# Patient Record
(demographics unavailable — no encounter records)

---

## 2025-05-25 NOTE — ASSESSMENT
[FreeTextEntry1] : reviewed medication post discharge from admission for NSTEMI and stent placement - Dr. Wilson [facility]-  review of his notes from end of December   continue current medications with f/u in 2 months as well as cardiology f/u labs from hospital stay   no lab orders low level MDM with continuity of care

## 2025-05-25 NOTE — PHYSICAL EXAM
[No Acute Distress] : no acute distress [Normal Sclera/Conjunctiva] : normal sclera/conjunctiva [No JVD] : no jugular venous distention [No Respiratory Distress] : no respiratory distress  [No Accessory Muscle Use] : no accessory muscle use [Clear to Auscultation] : lungs were clear to auscultation bilaterally [Normal Rate] : normal [Rhythm Regular] : regular [Normal S1] : normal S1 [Normal S2] : normal S2 [II] : a grade 2 [No Edema] : there was no peripheral edema [No Focal Deficits] : no focal deficits [Normal Gait] : normal gait [Normal Affect] : the affect was normal [Alert and Oriented x3] : oriented to person, place, and time [de-identified] : not lightheaded or dizzy when getting up  [de-identified] : some decreased memory will assist in looking for medication dispensers to keep her taking on

## 2025-05-25 NOTE — HISTORY OF PRESENT ILLNESS
[Coronary Artery Disease] : Coronary Artery Disease [Hyperlipidemia] : Hyperlipidemia [Hypertension] : Hypertension [Family Member] : family member [Does not check BP] : The patient is not checking blood pressure [<140/90] : Target blood pressure is <140/90 [Target goal met] : BP target goal met [Managed with medications] : managed with  medication [High Intensity therapy] : Patient is currently on high intensity statin therapy [Systolic] : systolic [None] : Patient does not have any symptoms [With moderate physical activity] : Shortness of breath with moderate physical activity [Checks Weight Sporadically] : The patient checks ~his/her~ weight sporadically [Stable] : The condition is stable [Symptoms Limit Activities] : Symptoms limit ~his/her~ activities [Able to walk ___ feet w/o symptoms] : ~he/she~ is able to walk [unfilled]  feet without symptoms [Stable] : Overall status has been stable [FreeTextEntry6] : Daughter is with the patient today [de-identified] : trying to follow dash diet - meals coming from family members, living alone with use of life line around her neck at all times while alone at home, aids during the day [FreeTextEntry1] : Post hospital for this had severe 3 vessel disease requiring multiple stents- on triple AC, asa, plavix and eliquis for afib

## 2025-05-25 NOTE — ASSESSMENT
[FreeTextEntry1] : reviewed medication post discharge from admission for NSTEMI and stent placement - Dr. Wilson [facility]-  review of his notes from end of December   continue current medications with f/u in 2 months as well as cardiology f/u labs from hospital stay   no lab orders low level MDM with continuity of care

## 2025-05-25 NOTE — HEALTH RISK ASSESSMENT
[Intercurrent hospitalizations] : was admitted to the hospital  [No] : In the past 12 months have you used drugs other than those required for medical reasons? No [No falls in past year] : Patient reported no falls in the past year [de-identified] : cardiology- for inpatient tx of NSTEMI- f/u with cardiology reviewed from post discharged [de-identified] : none at this time

## 2025-05-25 NOTE — HISTORY OF PRESENT ILLNESS
[Coronary Artery Disease] : Coronary Artery Disease [Hyperlipidemia] : Hyperlipidemia [Hypertension] : Hypertension [Family Member] : family member [Does not check BP] : The patient is not checking blood pressure [<140/90] : Target blood pressure is <140/90 [Target goal met] : BP target goal met [Managed with medications] : managed with  medication [High Intensity therapy] : Patient is currently on high intensity statin therapy [Systolic] : systolic [None] : Patient does not have any symptoms [With moderate physical activity] : Shortness of breath with moderate physical activity [Checks Weight Sporadically] : The patient checks ~his/her~ weight sporadically [Stable] : The condition is stable [Symptoms Limit Activities] : Symptoms limit ~his/her~ activities [Able to walk ___ feet w/o symptoms] : ~he/she~ is able to walk [unfilled]  feet without symptoms [Stable] : Overall status has been stable [FreeTextEntry6] : Daughter is with the patient today [de-identified] : trying to follow dash diet - meals coming from family members, living alone with use of life line around her neck at all times while alone at home, aids during the day [FreeTextEntry1] : Post hospital for this had severe 3 vessel disease requiring multiple stents- on triple AC, asa, plavix and eliquis for afib FAMILY HISTORY:  Father  Still living? No  Family history of premature coronary artery disease, Age at diagnosis: Age Unknown

## 2025-05-25 NOTE — REVIEW OF SYSTEMS
[Dryness] : dryness  [Lower Ext Edema] : lower extremity edema [Joint Pain] : joint pain [Back Pain] : back pain [Negative] : Heme/Lymph [Discharge] : no discharge [Pain] : no pain [Redness] : no redness [Vision Problems] : no vision problems [Itching] : no itching [Abdominal Pain] : no abdominal pain [Constipation] : no constipation [Diarrhea] : diarrhea [Vomiting] : no vomiting [Melena] : no melena [FreeTextEntry5] : varicose veins about the same, denies chest pain, sob or palpitations [FreeTextEntry9] : back pain worst in the am when getting up and at night when tired, hands and fingers much improved with topical nsaid

## 2025-05-25 NOTE — HEALTH RISK ASSESSMENT
[Intercurrent hospitalizations] : was admitted to the hospital  [No] : In the past 12 months have you used drugs other than those required for medical reasons? No [No falls in past year] : Patient reported no falls in the past year [de-identified] : cardiology- for inpatient tx of NSTEMI- f/u with cardiology reviewed from post discharged [de-identified] : none at this time

## 2025-05-25 NOTE — PHYSICAL EXAM
[No Acute Distress] : no acute distress [Normal Sclera/Conjunctiva] : normal sclera/conjunctiva [No JVD] : no jugular venous distention [No Respiratory Distress] : no respiratory distress  [No Accessory Muscle Use] : no accessory muscle use [Clear to Auscultation] : lungs were clear to auscultation bilaterally [Normal Rate] : normal [Rhythm Regular] : regular [Normal S1] : normal S1 [Normal S2] : normal S2 [II] : a grade 2 [No Edema] : there was no peripheral edema [No Focal Deficits] : no focal deficits [Normal Gait] : normal gait [Normal Affect] : the affect was normal [Alert and Oriented x3] : oriented to person, place, and time [de-identified] : not lightheaded or dizzy when getting up  [de-identified] : some decreased memory will assist in looking for medication dispensers to keep her taking on

## 2025-05-25 NOTE — CURRENT MEDS
[Takes medication as prescribed] : takes [None] : Patient does not have any barriers to medication adherence [Yes] : Reviewed medication list for presence of high-risk medications. [Blood Thinners] : blood thinners [FreeTextEntry1] : asa, stain, b blocker, and plavix, also eliquis

## 2025-07-22 NOTE — REVIEW OF SYSTEMS
[Fever] : no fever [Chills] : no chills [Heart Rate Is Slow] : the heart rate was not slow [Heart Rate Is Fast] : the heart rate was not fast [Chest Pain] : no chest pain [Palpitations] : no palpitations [Lower Ext Edema] : no lower extremity edema [Shortness Of Breath] : no shortness of breath [Wheezing] : no wheezing [Cough] : no cough [Abdominal Pain] : no abdominal pain [Vomiting] : no vomiting [Constipation] : no constipation [Diarrhea] : no diarrhea [Dysuria] : no dysuria [Confused] : no confusion [Dizziness] : no dizziness [Anxiety] : no anxiety [Depression] : no depression

## 2025-07-22 NOTE — PHYSICAL EXAM
[___ / 5] : Visuospatial / Executive: [unfilled] / 5 [___ / 3] : Attention (Serial 7 subtraction): [unfilled] / 3 [___ / 1] : Fluency: [unfilled] / 1 [___ / 2] : Abstraction: [unfilled] / 2 [___ / 5] : Delayed Recall: [unfilled] / 5 [___ / 6] : Orientation: [unfilled] / 6 [Alert] : alert [No Acute Distress] : in no acute distress [EOMI] : extraocular movements were intact [No Respiratory Distress] : no respiratory distress [No Acc Muscle Use] : no accessory muscle use [Normal Color / Pigmentation] : normal skin color and pigmentation [Normal Affect] : the affect was normal [Normal Mood] : the mood was normal [de-identified] : oriented to person and place [MocaTotal] : 14 [FreeTextEntry1] : 7/22/25

## 2025-07-22 NOTE — HISTORY OF PRESENT ILLNESS
[Two or more falls in past year] : Patient reported two or more falls in the past year [Completely Independent] : Completely independent. [Full assistance needed] : Assistance needed managing medications [] : Assistance needed managing finances. [Designated Healthcare Proxy] : Designated healthcare proxy [Name: ___] : Health Care Proxy's Name: [unfilled]  [Relationship: ___] : Relationship: [unfilled] [Little interest or pleasure doing things] : 1) Little interest or pleasure doing things [Feeling down, depressed, or hopeless] : 2) Feeling down, depressed, or hopeless [0] : 2) Feeling down, depressed, or hopeless: Not at all (0) [PHQ-2 Negative - No further assessment needed] : PHQ-2 Negative - No further assessment needed [FreeTextEntry1] : 89 yo female w/ a hx of a fib, htn, hld, CHF and CAD presents to the office to establish care and discuss cognitive issues. Patient was referred to me by her PCP Dr. Montano for a geriatric assessment.  Memory concerns - family noticed about 2 years ago. Had a heart attack around that time.  Daughter reports repetitive questions, word finding difficulty.  Patient stopped driving as patients family was concerned about safety. Her daughter Ileana Jorge has taken over managing finances. Her daughter Shannan lives with patient and is her caregiver. Pt is in the process of receiving Medicaid and the CDPAP program.   Fall assessment: pt has slow, unsteady gait. 2 falls reported in the past. During the first fall patient had pneumonia. Per daughter it was not clear whether she fell or passed out. 2nd fall was in patients yard. Tripped while gardening.  No recent DEXA noted on file.  HTN Controlled  HLD Taking  atorvastatin 80mg nightly.  CHF Taking entresto 24-26mg, furosemide 20mg, metoprolol 25mg   CAD Taking plavix, atorvastatin and metoprolol   Atrial fib  Follows up with cardiology   social history  lives with daughter in a house.  Retired,  in the past [TextBox_25] : due? [TextBox_19] : aged out [[id number]] : 0 [FreeTextEntry4] : Changed HCP from daughter Shannan to Ileana Jorge.  Advised patients daughter to bring in MOLST form for me to review at the next visit.